# Patient Record
Sex: MALE | Race: ASIAN | NOT HISPANIC OR LATINO | URBAN - METROPOLITAN AREA
[De-identification: names, ages, dates, MRNs, and addresses within clinical notes are randomized per-mention and may not be internally consistent; named-entity substitution may affect disease eponyms.]

---

## 2021-05-11 ENCOUNTER — OFFICE VISIT (OUTPATIENT)
Dept: URBAN - METROPOLITAN AREA CLINIC 82 | Facility: CLINIC | Age: 56
End: 2021-05-11
Payer: COMMERCIAL

## 2021-05-11 ENCOUNTER — LAB OUTSIDE AN ENCOUNTER (OUTPATIENT)
Dept: URBAN - METROPOLITAN AREA CLINIC 82 | Facility: CLINIC | Age: 56
End: 2021-05-11

## 2021-05-11 VITALS
HEART RATE: 73 BPM | DIASTOLIC BLOOD PRESSURE: 99 MMHG | TEMPERATURE: 97.9 F | SYSTOLIC BLOOD PRESSURE: 158 MMHG | BODY MASS INDEX: 22.44 KG/M2 | WEIGHT: 143 LBS | HEIGHT: 67 IN

## 2021-05-11 DIAGNOSIS — K82.4 GALLBLADDER POLYP: ICD-10-CM

## 2021-05-11 DIAGNOSIS — B18.1 CHRONIC VIRAL HEPATITIS B WITHOUT DELTA AGENT AND WITHOUT COMA: ICD-10-CM

## 2021-05-11 PROCEDURE — 99213 OFFICE O/P EST LOW 20 MIN: CPT | Performed by: INTERNAL MEDICINE

## 2021-05-11 RX ORDER — TENOFOVIR ALAFENAMIDE 25 MG/1
1 TABLET WITH FOOD TABLET ORAL ONCE A DAY
Status: ACTIVE | COMMUNITY

## 2021-05-11 RX ORDER — TENOFOVIR ALAFENAMIDE 25 MG/1
1 TABLET WITH FOOD TABLET ORAL ONCE A DAY
Qty: 90 TABLET | Refills: 3

## 2021-05-11 NOTE — HPI-TODAY'S VISIT:
He came here for yearly follow-up on his hepatitis B.  Currently offers no new complaint.  He has been taking his medication religiously.  He does does not experience any side effects.  He denies any alcohol use.  He needs a refill on his medication.

## 2021-05-14 LAB
AFP, SERUM: 2.1
AFP-L3%, SERUM: (no result)
ALBUMIN: 5
ALKALINE PHOSPHATASE: 97
ALT (SGPT): 20
AST (SGOT): 21
BILIRUBIN, DIRECT: 0.28
BILIRUBIN, TOTAL: 1.4
HBSAG CONFIRMATION: POSITIVE
HBSAG SCREEN: (no result)
HBV IU/ML: (no result)
HEP BE AB: POSITIVE
HEP BE AG: NEGATIVE
HEPATITIS B SURF AB QUANT: <3.1
LOG10 HBV IU/ML: (no result)
PROTEIN, TOTAL: 7.7
TEST INFORMATION:: (no result)

## 2021-05-17 ENCOUNTER — TELEPHONE ENCOUNTER (OUTPATIENT)
Dept: URBAN - METROPOLITAN AREA CLINIC 6 | Facility: CLINIC | Age: 56
End: 2021-05-17

## 2021-06-11 ENCOUNTER — OFFICE VISIT (OUTPATIENT)
Dept: URBAN - METROPOLITAN AREA CLINIC 81 | Facility: CLINIC | Age: 56
End: 2021-06-11
Payer: COMMERCIAL

## 2021-06-11 DIAGNOSIS — K82.4 GALLBLADDER POLYP: ICD-10-CM

## 2021-06-11 DIAGNOSIS — K76.89 LIVER CYST: ICD-10-CM

## 2021-06-11 PROCEDURE — 76705 ECHO EXAM OF ABDOMEN: CPT | Performed by: INTERNAL MEDICINE

## 2021-06-11 RX ORDER — TENOFOVIR ALAFENAMIDE 25 MG/1
1 TABLET WITH FOOD TABLET ORAL ONCE A DAY
Qty: 90 TABLET | Refills: 3 | Status: ACTIVE | COMMUNITY

## 2021-06-21 ENCOUNTER — OFFICE VISIT (OUTPATIENT)
Dept: URBAN - METROPOLITAN AREA CLINIC 82 | Facility: CLINIC | Age: 56
End: 2021-06-21
Payer: COMMERCIAL

## 2021-06-21 VITALS
BODY MASS INDEX: 22.26 KG/M2 | WEIGHT: 141.8 LBS | HEART RATE: 52 BPM | DIASTOLIC BLOOD PRESSURE: 99 MMHG | TEMPERATURE: 98.2 F | HEIGHT: 67 IN | SYSTOLIC BLOOD PRESSURE: 158 MMHG

## 2021-06-21 DIAGNOSIS — K82.4 GALLBLADDER POLYP: ICD-10-CM

## 2021-06-21 DIAGNOSIS — B18.1 CHRONIC VIRAL HEPATITIS B WITHOUT DELTA AGENT AND WITHOUT COMA: ICD-10-CM

## 2021-06-21 PROCEDURE — 99212 OFFICE O/P EST SF 10 MIN: CPT | Performed by: INTERNAL MEDICINE

## 2021-06-21 RX ORDER — TENOFOVIR ALAFENAMIDE 25 MG/1
1 TABLET WITH FOOD TABLET ORAL ONCE A DAY
Qty: 90 TABLET | Refills: 3 | Status: ACTIVE | COMMUNITY

## 2021-07-16 ENCOUNTER — TELEPHONE ENCOUNTER (OUTPATIENT)
Dept: URBAN - METROPOLITAN AREA CLINIC 6 | Facility: CLINIC | Age: 56
End: 2021-07-16

## 2021-08-03 ENCOUNTER — TELEPHONE ENCOUNTER (OUTPATIENT)
Dept: URBAN - METROPOLITAN AREA CLINIC 23 | Facility: CLINIC | Age: 56
End: 2021-08-03

## 2021-12-13 ENCOUNTER — OFFICE VISIT (OUTPATIENT)
Dept: URBAN - METROPOLITAN AREA CLINIC 82 | Facility: CLINIC | Age: 56
End: 2021-12-13
Payer: COMMERCIAL

## 2021-12-13 ENCOUNTER — LAB OUTSIDE AN ENCOUNTER (OUTPATIENT)
Dept: URBAN - METROPOLITAN AREA CLINIC 82 | Facility: CLINIC | Age: 56
End: 2021-12-13

## 2021-12-13 VITALS
BODY MASS INDEX: 22.32 KG/M2 | DIASTOLIC BLOOD PRESSURE: 98 MMHG | WEIGHT: 142.2 LBS | TEMPERATURE: 98.2 F | SYSTOLIC BLOOD PRESSURE: 184 MMHG | HEIGHT: 67 IN | HEART RATE: 63 BPM

## 2021-12-13 DIAGNOSIS — B18.1 CHRONIC VIRAL HEPATITIS B WITHOUT DELTA AGENT AND WITHOUT COMA: ICD-10-CM

## 2021-12-13 DIAGNOSIS — K82.4 GALLBLADDER POLYP: ICD-10-CM

## 2021-12-13 PROCEDURE — 99213 OFFICE O/P EST LOW 20 MIN: CPT | Performed by: INTERNAL MEDICINE

## 2021-12-13 RX ORDER — TENOFOVIR ALAFENAMIDE 25 MG/1
1 TABLET WITH FOOD TABLET ORAL ONCE A DAY
Qty: 90 TABLET | Refills: 3 | Status: ACTIVE | COMMUNITY

## 2021-12-18 LAB
ALBUMIN: 4.8
ALKALINE PHOSPHATASE: 104
ALT (SGPT): 12
AST (SGOT): 18
BILIRUBIN, DIRECT: 0.2
BILIRUBIN, TOTAL: 0.9
HBV IU/ML: (no result)
LOG10 HBV IU/ML: (no result)
PROTEIN, TOTAL: 7.5
TEST INFORMATION:: (no result)

## 2022-01-07 PROBLEM — 61977001: Status: ACTIVE | Noted: 2021-05-11

## 2022-01-21 ENCOUNTER — OFFICE VISIT (OUTPATIENT)
Dept: URBAN - METROPOLITAN AREA CLINIC 81 | Facility: CLINIC | Age: 57
End: 2022-01-21
Payer: COMMERCIAL

## 2022-01-21 DIAGNOSIS — B18.1 CHRONIC HEPATITIS B: ICD-10-CM

## 2022-01-21 DIAGNOSIS — K82.4 GALLBLADDER POLYP: ICD-10-CM

## 2022-01-21 PROCEDURE — 76705 ECHO EXAM OF ABDOMEN: CPT | Performed by: INTERNAL MEDICINE

## 2022-01-21 RX ORDER — TENOFOVIR ALAFENAMIDE 25 MG/1
1 TABLET WITH FOOD TABLET ORAL ONCE A DAY
Qty: 90 TABLET | Refills: 3 | Status: ACTIVE | COMMUNITY

## 2022-04-18 ENCOUNTER — TELEPHONE ENCOUNTER (OUTPATIENT)
Dept: URBAN - METROPOLITAN AREA CLINIC 82 | Facility: CLINIC | Age: 57
End: 2022-04-18

## 2022-04-18 RX ORDER — TENOFOVIR ALAFENAMIDE 25 MG/1
1 TABLET WITH FOOD TABLET ORAL ONCE A DAY
Qty: 90 TABLET | Refills: 3
End: 2023-04-13

## 2022-05-05 ENCOUNTER — TELEPHONE ENCOUNTER (OUTPATIENT)
Dept: URBAN - METROPOLITAN AREA CLINIC 115 | Facility: CLINIC | Age: 57
End: 2022-05-05

## 2023-05-04 ENCOUNTER — TELEPHONE ENCOUNTER (OUTPATIENT)
Dept: URBAN - METROPOLITAN AREA CLINIC 82 | Facility: CLINIC | Age: 58
End: 2023-05-04

## 2023-05-09 ENCOUNTER — TELEPHONE ENCOUNTER (OUTPATIENT)
Dept: URBAN - METROPOLITAN AREA CLINIC 82 | Facility: CLINIC | Age: 58
End: 2023-05-09

## 2023-05-12 ENCOUNTER — TELEPHONE ENCOUNTER (OUTPATIENT)
Dept: URBAN - METROPOLITAN AREA CLINIC 82 | Facility: CLINIC | Age: 58
End: 2023-05-12

## 2023-05-12 RX ORDER — TENOFOVIR ALAFENAMIDE 25 MG/1
1 TABLET WITH FOOD TABLET ORAL ONCE A DAY
Qty: 90 TABLET | Refills: 3
End: 2024-05-06

## 2023-05-15 ENCOUNTER — LAB OUTSIDE AN ENCOUNTER (OUTPATIENT)
Dept: URBAN - METROPOLITAN AREA CLINIC 82 | Facility: CLINIC | Age: 58
End: 2023-05-15

## 2023-05-26 ENCOUNTER — OFFICE VISIT (OUTPATIENT)
Dept: URBAN - METROPOLITAN AREA CLINIC 81 | Facility: CLINIC | Age: 58
End: 2023-05-26
Payer: COMMERCIAL

## 2023-05-26 DIAGNOSIS — B18.1 CHRONIC VIRAL HEPATITIS B WITHOUT DELTA AGENT AND WITHOUT COMA: ICD-10-CM

## 2023-05-26 DIAGNOSIS — K82.4 GALLBLADDER POLYP: ICD-10-CM

## 2023-05-26 DIAGNOSIS — K76.89 HEPATIC CYST: ICD-10-CM

## 2023-05-26 PROCEDURE — 76705 ECHO EXAM OF ABDOMEN: CPT | Performed by: INTERNAL MEDICINE

## 2023-06-14 ENCOUNTER — DASHBOARD ENCOUNTERS (OUTPATIENT)
Age: 58
End: 2023-06-14

## 2023-06-14 ENCOUNTER — OFFICE VISIT (OUTPATIENT)
Dept: URBAN - METROPOLITAN AREA CLINIC 82 | Facility: CLINIC | Age: 58
End: 2023-06-14
Payer: COMMERCIAL

## 2023-06-14 VITALS
DIASTOLIC BLOOD PRESSURE: 98 MMHG | SYSTOLIC BLOOD PRESSURE: 169 MMHG | BODY MASS INDEX: 22.41 KG/M2 | TEMPERATURE: 97.4 F | WEIGHT: 142.8 LBS | HEART RATE: 48 BPM | HEIGHT: 67 IN

## 2023-06-14 DIAGNOSIS — K82.4 GALLBLADDER POLYP: ICD-10-CM

## 2023-06-14 DIAGNOSIS — B18.1 CHRONIC VIRAL HEPATITIS B WITHOUT DELTA AGENT AND WITHOUT COMA: ICD-10-CM

## 2023-06-14 PROBLEM — 197433003: Status: ACTIVE | Noted: 2021-05-11

## 2023-06-14 PROCEDURE — 99214 OFFICE O/P EST MOD 30 MIN: CPT | Performed by: INTERNAL MEDICINE

## 2023-06-14 RX ORDER — TENOFOVIR ALAFENAMIDE 25 MG/1
1 TABLET WITH FOOD TABLET ORAL ONCE A DAY
Qty: 90 TABLET | Refills: 3 | Status: ACTIVE | COMMUNITY
End: 2024-05-06

## 2023-06-16 LAB
AFP, SERUM: 1.7
AFP-L3%, SERUM: (no result)
ALBUMIN: 5.3
ALKALINE PHOSPHATASE: 96
ALT (SGPT): 16
AST (SGOT): 20
BILIRUBIN, DIRECT: 0.26
BILIRUBIN, TOTAL: 1.5
HBSAG SCREEN: NEGATIVE
HBV IU/ML: (no result)
HEP BE AB: POSITIVE
HEP BE AG: NEGATIVE
HEPATITIS B SURF AB QUANT: <3.1
LOG10 HBV IU/ML: (no result)
PROTEIN, TOTAL: 7.8
TEST INFORMATION:: (no result)

## 2023-06-23 ENCOUNTER — TELEPHONE ENCOUNTER (OUTPATIENT)
Dept: URBAN - METROPOLITAN AREA CLINIC 82 | Facility: CLINIC | Age: 58
End: 2023-06-23

## 2023-06-23 RX ORDER — TENOFOVIR ALAFENAMIDE 25 MG/1
1 TABLET WITH FOOD TABLET ORAL ONCE A DAY
Qty: 90 TABLET | Refills: 3
End: 2024-06-22

## 2023-08-09 ENCOUNTER — TELEPHONE ENCOUNTER (OUTPATIENT)
Dept: URBAN - METROPOLITAN AREA CLINIC 82 | Facility: CLINIC | Age: 58
End: 2023-08-09

## 2023-12-15 ENCOUNTER — OFFICE VISIT (OUTPATIENT)
Dept: URBAN - METROPOLITAN AREA CLINIC 82 | Facility: CLINIC | Age: 58
End: 2023-12-15

## 2024-06-03 ENCOUNTER — OFFICE VISIT (OUTPATIENT)
Dept: URBAN - METROPOLITAN AREA CLINIC 82 | Facility: CLINIC | Age: 59
End: 2024-06-03
Payer: COMMERCIAL

## 2024-06-03 ENCOUNTER — LAB OUTSIDE AN ENCOUNTER (OUTPATIENT)
Dept: URBAN - METROPOLITAN AREA CLINIC 82 | Facility: CLINIC | Age: 59
End: 2024-06-03

## 2024-06-03 VITALS
BODY MASS INDEX: 21.66 KG/M2 | TEMPERATURE: 98.2 F | DIASTOLIC BLOOD PRESSURE: 88 MMHG | WEIGHT: 138 LBS | HEART RATE: 58 BPM | SYSTOLIC BLOOD PRESSURE: 157 MMHG | HEIGHT: 67 IN

## 2024-06-03 DIAGNOSIS — B18.1 CHRONIC VIRAL HEPATITIS B WITHOUT DELTA AGENT AND WITHOUT COMA: ICD-10-CM

## 2024-06-03 DIAGNOSIS — K76.89 HEPATIC CYST: ICD-10-CM

## 2024-06-03 DIAGNOSIS — K82.4 GALLBLADDER POLYP: ICD-10-CM

## 2024-06-03 PROCEDURE — 99214 OFFICE O/P EST MOD 30 MIN: CPT | Performed by: INTERNAL MEDICINE

## 2024-06-03 RX ORDER — TENOFOVIR ALAFENAMIDE 25 MG/1
1 TABLET WITH FOOD TABLET ORAL ONCE A DAY
Qty: 90 TABLET | Refills: 3 | Status: DISCONTINUED | COMMUNITY
End: 2024-06-22

## 2024-06-05 LAB
ALBUMIN: 4.7
ALKALINE PHOSPHATASE: 110
ALT (SGPT): 11
AST (SGOT): 17
BILIRUBIN, DIRECT: 0.25
BILIRUBIN, TOTAL: 1.1
HBSAG SCREEN: NEGATIVE
HBV IU/ML: (no result)
HEP BE AB: REACTIVE
HEP BE AG: NEGATIVE
LOG10 HBV IU/ML: (no result)
PROTEIN, TOTAL: 7.2

## 2024-06-06 ENCOUNTER — TELEPHONE ENCOUNTER (OUTPATIENT)
Dept: URBAN - METROPOLITAN AREA CLINIC 82 | Facility: CLINIC | Age: 59
End: 2024-06-06

## 2024-06-28 LAB
HEPATITIS B SURF AB QUANT: <3.5
WRITTEN AUTHORIZATION: (no result)

## 2024-07-19 ENCOUNTER — OFFICE VISIT (OUTPATIENT)
Dept: URBAN - METROPOLITAN AREA CLINIC 82 | Facility: CLINIC | Age: 59
End: 2024-07-19

## 2024-07-19 ENCOUNTER — OFFICE VISIT (OUTPATIENT)
Dept: URBAN - METROPOLITAN AREA CLINIC 81 | Facility: CLINIC | Age: 59
End: 2024-07-19
Payer: COMMERCIAL

## 2024-07-19 DIAGNOSIS — K82.4 GALLBLADDER POLYP: ICD-10-CM

## 2024-07-19 DIAGNOSIS — K76.89 LIVER CYST: ICD-10-CM

## 2024-07-19 PROCEDURE — 76705 ECHO EXAM OF ABDOMEN: CPT | Performed by: INTERNAL MEDICINE

## 2025-07-30 ENCOUNTER — LAB OUTSIDE AN ENCOUNTER (OUTPATIENT)
Dept: URBAN - METROPOLITAN AREA CLINIC 82 | Facility: CLINIC | Age: 60
End: 2025-07-30

## 2025-07-30 ENCOUNTER — OFFICE VISIT (OUTPATIENT)
Dept: URBAN - METROPOLITAN AREA CLINIC 82 | Facility: CLINIC | Age: 60
End: 2025-07-30
Payer: COMMERCIAL

## 2025-07-30 DIAGNOSIS — B18.1 CHRONIC VIRAL HEPATITIS B WITHOUT DELTA AGENT AND WITHOUT COMA: ICD-10-CM

## 2025-07-30 DIAGNOSIS — K82.4 GALLBLADDER POLYP: ICD-10-CM

## 2025-07-30 DIAGNOSIS — K76.89 HEPATIC CYST: ICD-10-CM

## 2025-07-30 PROCEDURE — 99214 OFFICE O/P EST MOD 30 MIN: CPT | Performed by: INTERNAL MEDICINE

## 2025-08-15 ENCOUNTER — OFFICE VISIT (OUTPATIENT)
Dept: URBAN - METROPOLITAN AREA CLINIC 81 | Facility: CLINIC | Age: 60
End: 2025-08-15
Payer: COMMERCIAL

## 2025-08-15 DIAGNOSIS — K82.4 GALLBLADDER POLYP: ICD-10-CM

## 2025-08-15 DIAGNOSIS — K76.89 HEPATIC CYST: ICD-10-CM

## 2025-08-15 DIAGNOSIS — K76.0 HEPATIC STEATOSIS: ICD-10-CM

## 2025-08-15 PROCEDURE — 76705 ECHO EXAM OF ABDOMEN: CPT | Performed by: INTERNAL MEDICINE
